# Patient Record
Sex: MALE | Race: BLACK OR AFRICAN AMERICAN | NOT HISPANIC OR LATINO | URBAN - METROPOLITAN AREA
[De-identification: names, ages, dates, MRNs, and addresses within clinical notes are randomized per-mention and may not be internally consistent; named-entity substitution may affect disease eponyms.]

---

## 2017-08-02 ENCOUNTER — EMERGENCY (EMERGENCY)
Facility: HOSPITAL | Age: 22
LOS: 0 days | Discharge: HOME | End: 2017-08-02

## 2017-08-02 DIAGNOSIS — S60.454A SUPERFICIAL FOREIGN BODY OF RIGHT RING FINGER, INITIAL ENCOUNTER: ICD-10-CM

## 2017-08-02 DIAGNOSIS — S60.414A ABRASION OF RIGHT RING FINGER, INITIAL ENCOUNTER: ICD-10-CM
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
## 2017-08-02 DIAGNOSIS — S60.412A ABRASION OF RIGHT MIDDLE FINGER, INITIAL ENCOUNTER: ICD-10-CM

## 2017-08-02 DIAGNOSIS — M79.641 PAIN IN RIGHT HAND: ICD-10-CM

## 2017-08-02 DIAGNOSIS — S61.411A LACERATION WITHOUT FOREIGN BODY OF RIGHT HAND, INITIAL ENCOUNTER: ICD-10-CM
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
## 2018-10-02 ENCOUNTER — EMERGENCY (EMERGENCY)
Facility: HOSPITAL | Age: 23
LOS: 0 days | Discharge: HOME | End: 2018-10-02

## 2018-10-02 VITALS
OXYGEN SATURATION: 96 % | DIASTOLIC BLOOD PRESSURE: 74 MMHG | HEART RATE: 87 BPM | SYSTOLIC BLOOD PRESSURE: 128 MMHG | TEMPERATURE: 99 F | RESPIRATION RATE: 18 BRPM

## 2018-10-02 VITALS — HEIGHT: 71 IN | WEIGHT: 149.91 LBS

## 2018-10-02 DIAGNOSIS — M25.569 PAIN IN UNSPECIFIED KNEE: ICD-10-CM

## 2018-10-02 DIAGNOSIS — L03.115 CELLULITIS OF RIGHT LOWER LIMB: ICD-10-CM

## 2018-10-02 RX ORDER — IBUPROFEN 200 MG
600 TABLET ORAL ONCE
Qty: 0 | Refills: 0 | Status: COMPLETED | OUTPATIENT
Start: 2018-10-02 | End: 2018-10-02

## 2018-10-02 RX ORDER — AZTREONAM 2 G
1 VIAL (EA) INJECTION
Qty: 20 | Refills: 0 | OUTPATIENT
Start: 2018-10-02 | End: 2018-10-11

## 2018-10-02 RX ADMIN — Medication 1 TABLET(S): at 18:33

## 2018-10-02 RX ADMIN — Medication 600 MILLIGRAM(S): at 18:34

## 2018-10-02 NOTE — ED PROVIDER NOTE - NS ED ROS FT
CONST: No chills or bodyaches  EYES: No pain, redness, drainage or visual changes.  ENT: No ear pain or discharge, nasal discharge or congestion. No sore throat  CARD: No chest pain, palpitations  RESP: No SOB, cough, hemoptysis. No hx of asthma or COPD  NEURO: No headache, dizziness, paresthesias or LOC

## 2018-10-02 NOTE — ED PROVIDER NOTE - OBJECTIVE STATEMENT
Pt has right knee pain redness and fever of 100.3 3 days ago but not since then. No tylenol or advil taken since then either. Pt thinks may have been bitten by something on knee because noticed a small wound that was itchy. Then developed redness and fever.

## 2020-08-02 NOTE — ED ADULT NURSE NOTE - CAS TRG GENERAL AIRWAY, MLM
Attempted to call back patient's father Kody's phone call at [phone], but there was no answer. Voicemail left.    Patent

## 2023-06-08 NOTE — ED ADULT NURSE NOTE - CAS EDN DISCHARGE ASSESSMENT
You do not have any evidence for deep vein thrombosis.  Ultrasound confirms that you do have a joint effusion.  Clinically your symptoms are not consistent with infection in the joint.  I suspect that you have fluid in the joint that is hindering mobility/range of motion.  When you see your orthopedic specialist they will consider either putting you on an oral steroid to avoid tapping the knee or they will consider tapping the need to remove the joint fluid and then give you intra-articular cortisone.  Recommend icing the knee aggressively.  No restriction activity.   Alert and oriented to person, place and time

## 2024-06-06 NOTE — ED ADULT NURSE NOTE - NS ED NURSE LEVEL OF CONSCIOUSNESS ORIENTATION
Oriented - self; Oriented - place; Oriented - time [Follow-up Visit ___] : a follow-up visit  for [unfilled]